# Patient Record
Sex: FEMALE
[De-identification: names, ages, dates, MRNs, and addresses within clinical notes are randomized per-mention and may not be internally consistent; named-entity substitution may affect disease eponyms.]

---

## 2022-06-22 PROBLEM — Z00.00 ENCOUNTER FOR PREVENTIVE HEALTH EXAMINATION: Status: ACTIVE | Noted: 2022-06-22

## 2022-07-01 ENCOUNTER — LABORATORY RESULT (OUTPATIENT)
Age: 42
End: 2022-07-01

## 2022-07-01 ENCOUNTER — APPOINTMENT (OUTPATIENT)
Dept: RHEUMATOLOGY | Facility: CLINIC | Age: 42
End: 2022-07-01

## 2022-07-01 VITALS
HEART RATE: 85 BPM | DIASTOLIC BLOOD PRESSURE: 88 MMHG | BODY MASS INDEX: 27.48 KG/M2 | HEIGHT: 60 IN | OXYGEN SATURATION: 100 % | WEIGHT: 140 LBS | SYSTOLIC BLOOD PRESSURE: 138 MMHG

## 2022-07-01 DIAGNOSIS — Z86.69 PERSONAL HISTORY OF OTHER DISEASES OF THE NERVOUS SYSTEM AND SENSE ORGANS: ICD-10-CM

## 2022-07-01 DIAGNOSIS — Z78.9 OTHER SPECIFIED HEALTH STATUS: ICD-10-CM

## 2022-07-01 DIAGNOSIS — Z83.3 FAMILY HISTORY OF DIABETES MELLITUS: ICD-10-CM

## 2022-07-01 DIAGNOSIS — M19.90 UNSPECIFIED OSTEOARTHRITIS, UNSPECIFIED SITE: ICD-10-CM

## 2022-07-01 DIAGNOSIS — Z86.19 PERSONAL HISTORY OF OTHER INFECTIOUS AND PARASITIC DISEASES: ICD-10-CM

## 2022-07-01 DIAGNOSIS — B94.8 SEQUELAE OF OTHER SPECIFIED INFECTIOUS AND PARASITIC DISEASES: ICD-10-CM

## 2022-07-01 DIAGNOSIS — M25.50 PAIN IN UNSPECIFIED JOINT: ICD-10-CM

## 2022-07-01 PROCEDURE — 36415 COLL VENOUS BLD VENIPUNCTURE: CPT

## 2022-07-01 PROCEDURE — 99204 OFFICE O/P NEW MOD 45 MIN: CPT | Mod: 25

## 2022-07-01 RX ORDER — IBUPROFEN 600 MG/1
600 TABLET, FILM COATED ORAL
Qty: 60 | Refills: 0 | Status: ACTIVE | COMMUNITY
Start: 2022-06-08

## 2022-07-01 NOTE — HISTORY OF PRESENT ILLNESS
[FreeTextEntry1] : 40 y/o female referred to rheumatology for Lyme disease\par Pt was diagnosed Lyme disease 4 years ago with hand swelling, body aches, headaches, weakness and received IV Abx. Since then pt has had severe pains all over the body including hands and feet and headaches since then. Pt has been followed by ID since then, last seen >2 years ago.\par Pt uses ibuprofen PRN and flower tea at home for the pains.\par Pt denies joint swelling, redness, warmth\par Reports chronic numbness of her R hand.\par Denies other systemic symptoms including fever, chills, chest pain, abdominal pain, cough, SOB, nausea, vomiting, diarrhea, rash, Raynaud's\par ROS negative unless otherwise noted above.\par \par

## 2022-07-01 NOTE — ASSESSMENT
[FreeTextEntry1] : 40 y/o female referred to rheumatology for Lyme disease\par Pt was diagnosed Lyme disease 4 years ago with hand swelling, body aches, headaches, weakness and received IV Abx. Since then pt has had severe pains all over the body including hands and feet and headaches since then. Pt has been followed by ID since then, last seen >2 years ago.\par Pt uses ibuprofen PRN and flower tea at home for the pains.\par Pt denies joint swelling, redness, warmth\par Reports chronic numbness of her R hand.\par Denies other systemic symptoms including fever, chills, chest pain, abdominal pain, cough, SOB, nausea, vomiting, diarrhea, rash, Raynaud's\par ROS negative unless otherwise noted above.\par \par Pt has severe pains of non-articular points throughout the body that started with severe Lyme disease. 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 Part of diagnosis would be to rule out other causes of pt's pains including active Lyme disease. If post-Lyme disease is diagnosed, treatment is generally conservative but can consider long term pain control with medications used for chronic pain syndrome and fibromyalgia.\par \par - Obtain labwork to evaluate for signs of autoimmune rheum diseases\par - Refer to ID to evaluate any indication for further Abx therapy\par - If workup unremarkable, will consider starting medications such as Cymbalta, gabapentin, Elavil, muscle relaxants\par - RTC 1 month for follow up\par \par

## 2022-07-01 NOTE — REASON FOR VISIT
[Initial Evaluation] : an initial evaluation [[employer] ] : provided by [employer]   [Interpreters_IDNumber] : [id number] [Interpreters_FullName] : Elvis [TWNoteComboBox1] : [place]

## 2022-07-04 LAB
ALBUMIN SERPL ELPH-MCNC: 4.7 G/DL
ALDOLASE SERPL-CCNC: 3.8 U/L
ALP BLD-CCNC: 76 U/L
ALT SERPL-CCNC: 9 U/L
ANION GAP SERPL CALC-SCNC: 13 MMOL/L
AST SERPL-CCNC: 15 U/L
BASOPHILS # BLD AUTO: 0.08 K/UL
BASOPHILS NFR BLD AUTO: 1 %
BILIRUB SERPL-MCNC: <0.2 MG/DL
BUN SERPL-MCNC: 14 MG/DL
CALCIUM SERPL-MCNC: 9.7 MG/DL
CHLORIDE SERPL-SCNC: 100 MMOL/L
CK SERPL-CCNC: 54 U/L
CO2 SERPL-SCNC: 22 MMOL/L
CREAT SERPL-MCNC: 0.52 MG/DL
CRP SERPL-MCNC: <3 MG/L
DSDNA AB SER-ACNC: <12 IU/ML
EGFR: 119 ML/MIN/1.73M2
EOSINOPHIL # BLD AUTO: 0.24 K/UL
EOSINOPHIL NFR BLD AUTO: 2.9 %
ERYTHROCYTE [SEDIMENTATION RATE] IN BLOOD BY WESTERGREN METHOD: 5 MM/HR
GLUCOSE SERPL-MCNC: 103 MG/DL
HCT VFR BLD CALC: 37.7 %
HGB BLD-MCNC: 11.9 G/DL
IMM GRANULOCYTES NFR BLD AUTO: 0.4 %
LYMPHOCYTES # BLD AUTO: 2.38 K/UL
LYMPHOCYTES NFR BLD AUTO: 28.3 %
MAN DIFF?: NORMAL
MCHC RBC-ENTMCNC: 30.1 PG
MCHC RBC-ENTMCNC: 31.6 GM/DL
MCV RBC AUTO: 95.4 FL
MONOCYTES # BLD AUTO: 0.68 K/UL
MONOCYTES NFR BLD AUTO: 8.1 %
NEUTROPHILS # BLD AUTO: 5 K/UL
NEUTROPHILS NFR BLD AUTO: 59.3 %
PLATELET # BLD AUTO: 288 K/UL
POTASSIUM SERPL-SCNC: 3.9 MMOL/L
PROT SERPL-MCNC: 7.9 G/DL
RBC # BLD: 3.95 M/UL
RBC # FLD: 13.7 %
RHEUMATOID FACT SER QL: 41 IU/ML
SODIUM SERPL-SCNC: 135 MMOL/L
THYROGLOB AB SERPL-ACNC: <20 IU/ML
THYROPEROXIDASE AB SERPL IA-ACNC: 13.4 IU/ML
WBC # FLD AUTO: 8.41 K/UL

## 2022-07-05 LAB
ANA SER IF-ACNC: NEGATIVE
C3 SERPL-MCNC: 109 MG/DL
C4 SERPL-MCNC: 14 MG/DL
CCP AB SER IA-ACNC: 18 UNITS
RF+CCP IGG SER-IMP: NEGATIVE

## 2022-07-06 LAB
ENA RNP AB SER IA-ACNC: <0.2 AL
ENA SM AB SER IA-ACNC: <0.2 AL
ENA SS-A AB SER IA-ACNC: <0.2 AL
ENA SS-B AB SER IA-ACNC: <0.2 AL

## 2022-08-09 ENCOUNTER — APPOINTMENT (OUTPATIENT)
Dept: RHEUMATOLOGY | Facility: CLINIC | Age: 42
End: 2022-08-09